# Patient Record
Sex: FEMALE | Race: WHITE | NOT HISPANIC OR LATINO | URBAN - METROPOLITAN AREA
[De-identification: names, ages, dates, MRNs, and addresses within clinical notes are randomized per-mention and may not be internally consistent; named-entity substitution may affect disease eponyms.]

---

## 2019-06-16 ENCOUNTER — EMERGENCY (EMERGENCY)
Facility: HOSPITAL | Age: 24
LOS: 1 days | Discharge: HOME | End: 2019-06-16
Attending: EMERGENCY MEDICINE | Admitting: EMERGENCY MEDICINE
Payer: COMMERCIAL

## 2019-06-16 VITALS
SYSTOLIC BLOOD PRESSURE: 109 MMHG | RESPIRATION RATE: 16 BRPM | TEMPERATURE: 99 F | WEIGHT: 145.06 LBS | OXYGEN SATURATION: 99 % | DIASTOLIC BLOOD PRESSURE: 65 MMHG | HEIGHT: 66 IN

## 2019-06-16 PROCEDURE — 72040 X-RAY EXAM NECK SPINE 2-3 VW: CPT

## 2019-06-16 PROCEDURE — 99284 EMERGENCY DEPT VISIT MOD MDM: CPT | Mod: 25

## 2019-06-16 PROCEDURE — 71046 X-RAY EXAM CHEST 2 VIEWS: CPT

## 2019-06-16 PROCEDURE — 72040 X-RAY EXAM NECK SPINE 2-3 VW: CPT | Mod: 26

## 2019-06-16 PROCEDURE — 99283 EMERGENCY DEPT VISIT LOW MDM: CPT

## 2019-06-16 PROCEDURE — 71046 X-RAY EXAM CHEST 2 VIEWS: CPT | Mod: 26

## 2019-06-16 RX ORDER — METHOCARBAMOL 500 MG/1
1500 TABLET, FILM COATED ORAL ONCE
Refills: 0 | Status: COMPLETED | OUTPATIENT
Start: 2019-06-16 | End: 2019-06-16

## 2019-06-16 RX ORDER — METHOCARBAMOL 500 MG/1
1 TABLET, FILM COATED ORAL
Qty: 9 | Refills: 0
Start: 2019-06-16 | End: 2019-06-18

## 2019-06-16 RX ORDER — IBUPROFEN 200 MG
600 TABLET ORAL ONCE
Refills: 0 | Status: COMPLETED | OUTPATIENT
Start: 2019-06-16 | End: 2019-06-16

## 2019-06-16 RX ADMIN — METHOCARBAMOL 1500 MILLIGRAM(S): 500 TABLET, FILM COATED ORAL at 18:48

## 2019-06-16 RX ADMIN — Medication 600 MILLIGRAM(S): at 18:48

## 2019-06-16 RX ADMIN — Medication 30 MILLILITER(S): at 18:48
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
## 2019-06-16 NOTE — ED ADULT TRIAGE NOTE - NS ED TRIAGE PATIENT LAST STATUS
Final Diagnosis:  Pregnancy at 37w6d delivered     Reason for Hospitalization: onset of labor and/or spontaneous rupture of membranes    Significant Findings:  Liveborn [place]     Complications: NO    HGB (g/dL)   Date Value   2017 10.1 (L)       HCT (%)   Date Value   2017 30.3 (L)        Procedures Performed: spontaneous vaginal delivery    Condition on Discharge:  Recovering 37 year old female     Information for the patient's :  Jamie Mitchell [[id number]]       Discharge Instructions: Given to patient.      [facility].                       13:30
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
## 2019-06-16 NOTE — ED PROVIDER NOTE - CHPI ED SYMPTOMS NEG
no disorientation/no laceration/no bruising/no difficulty bearing weight/no loss of consciousness/no sleeping issues/no crying/no decreased eating/drinking/no fussiness/no dizziness

## 2019-06-16 NOTE — ED PROVIDER NOTE - MUSCULOSKELETAL, MLM
Spine appears normal, range of motion is not limited, + para spinal tenderness to palpation, FROM neck, NV intact CN intact
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
## 2021-01-22 ENCOUNTER — APPOINTMENT (OUTPATIENT)
Dept: ENDOCRINOLOGY | Facility: CLINIC | Age: 26
End: 2021-01-22

## 2021-07-30 ENCOUNTER — OUTPATIENT (OUTPATIENT)
Dept: OUTPATIENT SERVICES | Facility: HOSPITAL | Age: 26
LOS: 1 days | End: 2021-07-30
Payer: COMMERCIAL

## 2021-07-30 DIAGNOSIS — R11.2 NAUSEA WITH VOMITING, UNSPECIFIED: ICD-10-CM

## 2021-07-30 PROCEDURE — 74240 X-RAY XM UPR GI TRC 1CNTRST: CPT

## 2021-07-30 PROCEDURE — 74240 X-RAY XM UPR GI TRC 1CNTRST: CPT | Mod: 26

## 2021-08-19 ENCOUNTER — RESULT REVIEW (OUTPATIENT)
Age: 26
End: 2021-08-19

## 2021-10-18 ENCOUNTER — APPOINTMENT (OUTPATIENT)
Dept: GASTROENTEROLOGY | Facility: CLINIC | Age: 26
End: 2021-10-18
Payer: COMMERCIAL

## 2021-10-18 ENCOUNTER — TRANSCRIPTION ENCOUNTER (OUTPATIENT)
Age: 26
End: 2021-10-18

## 2021-10-18 VITALS
TEMPERATURE: 97.5 F | SYSTOLIC BLOOD PRESSURE: 120 MMHG | DIASTOLIC BLOOD PRESSURE: 80 MMHG | HEIGHT: 66 IN | WEIGHT: 146 LBS | BODY MASS INDEX: 23.46 KG/M2

## 2021-10-18 DIAGNOSIS — R11.2 NAUSEA WITH VOMITING, UNSPECIFIED: ICD-10-CM

## 2021-10-18 DIAGNOSIS — R10.13 EPIGASTRIC PAIN: ICD-10-CM

## 2021-10-18 PROCEDURE — 99203 OFFICE O/P NEW LOW 30 MIN: CPT

## 2021-10-18 RX ORDER — TOBRAMYCIN 3 MG/ML
0.3 SOLUTION/ DROPS OPHTHALMIC
Qty: 10 | Refills: 0 | Status: ACTIVE | COMMUNITY
Start: 2021-04-29

## 2021-10-18 RX ORDER — PANTOPRAZOLE 40 MG/1
40 TABLET, DELAYED RELEASE ORAL
Qty: 90 | Refills: 1 | Status: ACTIVE | COMMUNITY

## 2021-10-18 RX ORDER — NORGESTIMATE AND ETHINYL ESTRADIOL 0.25-0.035
KIT ORAL
Refills: 0 | Status: ACTIVE | COMMUNITY

## 2021-10-18 NOTE — ASSESSMENT
[FreeTextEntry1] : Impression: Postprandial vomiting and epigastric tenderness probable functional dyspepsia no evidence of gastroparesis, no evidence of obstruction, and celiac disease is in remission.  Poor response to PPI and unable to tolerate metoclopramide.\par \par Plan: Continue PPI.  Begin desipramine 25 mg nightly.  May continue ondansetron as needed.  If symptoms persist consider CT scan to rule out SMA syndrome although unlikely.

## 2021-10-18 NOTE — PHYSICAL EXAM
[General Appearance - Alert] : alert [General Appearance - In No Acute Distress] : in no acute distress [Neck Appearance] : the appearance of the neck was normal [Neck Cervical Mass (___cm)] : no neck mass was observed [Jugular Venous Distention Increased] : there was no jugular-venous distention [Thyroid Diffuse Enlargement] : the thyroid was not enlarged [Thyroid Nodule] : there were no palpable thyroid nodules [Auscultation Breath Sounds / Voice Sounds] : lungs were clear to auscultation bilaterally [Heart Rate And Rhythm] : heart rate was normal and rhythm regular [Heart Sounds Gallop] : no gallops [Heart Sounds] : normal S1 and S2 [Murmurs] : no murmurs [Heart Sounds Pericardial Friction Rub] : no pericardial rub [Full Pulse] : the pedal pulses are present [Edema] : there was no peripheral edema [Bowel Sounds] : normal bowel sounds [Abdomen Soft] : soft [] : no hepato-splenomegaly [Abdomen Mass (___ Cm)] : no abdominal mass palpated [Abdomen Hernia] : no hernia was discovered [Epigastric] : in the epigastric area [FreeTextEntry1] : No bruit

## 2021-10-18 NOTE — HISTORY OF PRESENT ILLNESS
[Diarrhea] : denies diarrhea [Yellow Skin Or Eyes (Jaundice)] : denies jaundice [Abdominal Swelling] : denies abdominal swelling [Rectal Pain] : denies rectal pain [Heartburn] : heartburn [Nausea] : nausea [Vomiting] : vomiting [Constipation] : constipation [Abdominal Pain] : abdominal pain [GERD] : gastroesophageal reflux disease [Hiatus Hernia] : no hiatus hernia [Peptic Ulcer Disease] : no peptic ulcer disease [Pancreatitis] : no pancreatitis [Cholelithiasis] : no cholelithiasis [Kidney Stone] : no kidney stone [Inflammatory Bowel Disease] : no inflammatory bowel disease [Irritable Bowel Syndrome] : no irritable bowel syndrome [Diverticulitis] : no diverticulitis [Alcohol Abuse] : no alcohol abuse [Malignancy] : no malignancy [Abdominal Surgery] : no abdominal surgery [Appendectomy] : no appendectomy [Cholecystectomy] : no cholecystectomy [de-identified] : 25-year-old female with celiac disease since age 7 follows a strict gluten-free diet except for the occasional beer has been experiencing recurrent vomiting after meals since July 17 of this year.  Often awakens with nausea and has been taken ondansetron as needed which tends to help with that.  Subsequently every time she eats is followed shortly thereafter by vomiting.  She has no abdominal pain initially but eventually develops abdominal pain secondary to vomiting.  She also has frequent heartburn.  Labs including celiac markers were initially negative.  She underwent an upper endoscopy with biopsy which was normal including celiac biopsies.  She had a gastric emptying study and which was normal.  Abdominal sonogram was normal.  Upper GI small bowel series showed slight delay in gastric emptying but overall was a normal study.  She has been on pantoprazole 40 mg for the past month.  She took it daily for the first 2 weeks and it seemed to help, but her gastroenterologist try to wean her off it and prescribed metoclopramide which she did not tolerate due to constipation and fatigue.  She had similar symptoms 3 years ago after the death of her grandmother.  Symptoms eventually resolved after a course of psychotherapy there are no particular life stresses at this time although she did graduate (masters degree) and started new job just prior to the onset of the symptoms.  Patient denies marijuana use but does vape nicotine.

## 2021-11-03 DIAGNOSIS — R10.13 EPIGASTRIC PAIN: ICD-10-CM

## 2021-11-03 RX ORDER — DESIPRAMINE 25 MG/1
25 TABLET, FILM COATED ORAL
Qty: 30 | Refills: 4 | Status: DISCONTINUED | COMMUNITY
Start: 2021-10-18 | End: 2021-11-03

## 2021-11-05 RX ORDER — MIRTAZAPINE 15 MG/1
15 TABLET, FILM COATED ORAL
Qty: 30 | Refills: 5 | Status: DISCONTINUED | COMMUNITY
Start: 2021-11-03 | End: 2021-11-05

## 2021-11-16 RX ORDER — ALPRAZOLAM 0.25 MG/1
0.25 TABLET ORAL TWICE DAILY
Qty: 60 | Refills: 0 | Status: ACTIVE | COMMUNITY
Start: 2021-11-16 | End: 1900-01-01

## 2021-11-19 ENCOUNTER — TRANSCRIPTION ENCOUNTER (OUTPATIENT)
Age: 26
End: 2021-11-19

## 2021-11-22 RX ORDER — ONDANSETRON 4 MG/1
4 TABLET, ORALLY DISINTEGRATING ORAL EVERY 8 HOURS
Qty: 9 | Refills: 3 | Status: ACTIVE | COMMUNITY

## 2021-12-03 RX ORDER — TRAZODONE HYDROCHLORIDE 50 MG/1
50 TABLET ORAL
Qty: 15 | Refills: 5 | Status: ACTIVE | COMMUNITY
Start: 2021-12-03 | End: 1900-01-01

## 2021-12-03 RX ORDER — AMITRIPTYLINE HYDROCHLORIDE 10 MG/1
10 TABLET, FILM COATED ORAL
Qty: 60 | Refills: 5 | Status: DISCONTINUED | COMMUNITY
Start: 2021-11-05 | End: 2021-12-03
